# Patient Record
(demographics unavailable — no encounter records)

---

## 2024-10-17 NOTE — PHYSICAL EXAM
[No Acute Distress] : no acute distress [Normal Sclera/Conjunctiva] : normal sclera/conjunctiva [Normal Outer Ear/Nose] : the outer ears and nose were normal in appearance [No JVD] : no jugular venous distention [No Edema] : there was no peripheral edema [Rounded] : rounded [Normal] : normal [Soft, Nontender] : the abdomen was soft and nontender [No Mass] : no masses were palpated [No HSM] : no hepatosplenomegaly noted [Normal Anterior Cervical Nodes] : no anterior cervical lymphadenopathy [No CVA Tenderness] : no CVA  tenderness [Coordination Grossly Intact] : coordination grossly intact [No Focal Deficits] : no focal deficits [Alert and Oriented x3] : oriented to person, place, and time

## 2024-10-21 NOTE — DISCUSSION/SUMMARY
[FreeTextEntry1] : The patient is a 50-year-old female +FH, HTN, HLD who is deconditioned.  #1 CV- ECHO normal 12/21, needs to establish a regular daily exercise routine then reevaluate symptoms #2 HTN- c/w metoprolol and valsartan 40mg #3 Tachycardia- c/w metoprolol 100mg #4 HLD- c/w atorvastatin #5 General - Received Moderna vaccines. Vit D supplement  [EKG obtained to assist in diagnosis and management of assessed problem(s)] : EKG obtained to assist in diagnosis and management of assessed problem(s)

## 2024-10-21 NOTE — REVIEW OF SYSTEMS
[SOB] : shortness of breath [Dyspnea on exertion] : dyspnea during exertion [Negative] : Heme/Lymph [Chest Discomfort] : no chest discomfort [Lower Ext Edema] : no extremity edema [Palpitations] : no palpitations [Orthopnea] : no orthopnea [PND] : no PND

## 2024-10-21 NOTE — HISTORY OF PRESENT ILLNESS
[FreeTextEntry1] : Natividad last seen 6/22. She is under stress with  who had stroke but recovered and mother in law who is very ill. Anxious. Not exercising. worried about her heart health. Knows needs to exercise and lose weight. Gets SOB easily.

## 2025-05-15 NOTE — HISTORY OF PRESENT ILLNESS
[FreeTextEntry1] : 8/2022: Diverticulosis, mild colonic spasm consistent with IBS. Repeat 10 years.  [de-identified] : 12/2015: Negative

## 2025-05-15 NOTE — ASSESSMENT
[FreeTextEntry1] : 11/2021: 47-year-old female history of anxiety, irritable bowel syndrome, B12 deficiency and recent vitamin D deficiency. She had variable relief with the dicyclomine. There is no weight loss or anorexia. She is prescribed vitamin D 50,000 units once a week. She continues with vitamin B12. SHe has had some perianal discomfort, and brbpr on tp. She is interested in a colonoscopy for screening this July, 2022.    RTO 5/15/25 for hemorrhoid. Patient complains of recent rectal bleeding, itching and burning, especially after straining with BMs. Reviewed last colonoscopy performed 8/2022. Discussed hx IBS, recalls episodes of constipation. Rectal bleeding has subsided the past couple of days, reports lump outside of rectum. Patient has tried preparation H with minimal relief. Currently menstruating. Denies sob, cp, acid reflux or abdominal pain. Discussed trial of hydrocortisone 2.5% rectal cream and stool softener, patient agreeable. Will follow up with progress in 2-3 weeks. If rectal bleeding continues, will discuss further testing.   Plan: 1. Rx hydrocortisone 2.5% rectal cream and stool softener.  2. Follow up NP 2-3 weeks to monitor progress.